# Patient Record
Sex: MALE | Race: WHITE | Employment: UNEMPLOYED | URBAN - METROPOLITAN AREA
[De-identification: names, ages, dates, MRNs, and addresses within clinical notes are randomized per-mention and may not be internally consistent; named-entity substitution may affect disease eponyms.]

---

## 2023-04-04 DIAGNOSIS — M25.562 LEFT KNEE PAIN, UNSPECIFIED CHRONICITY: Primary | ICD-10-CM

## 2023-04-07 DIAGNOSIS — M25.562 LEFT KNEE PAIN, UNSPECIFIED CHRONICITY: ICD-10-CM

## 2023-04-08 LAB
ANION GAP SERPL CALCULATED.3IONS-SCNC: 13 MMOL/L (ref 3–15)
BUN BLDV-MCNC: 13 MG/DL (ref 6–22)
CALCIUM SERPL-MCNC: 9.7 MG/DL (ref 8.4–10.5)
CHLORIDE BLD-SCNC: 103 MMOL/L (ref 98–110)
CO2: 25 MMOL/L (ref 20–32)
CREAT SERPL-MCNC: 0.8 MG/DL (ref 0.5–1.2)
GLOMERULAR FILTRATION RATE: >60 ML/MIN/1.73 SQ.M.
GLUCOSE: 147 MG/DL (ref 70–99)
HCT VFR BLD CALC: 20.5 % (ref 39.3–51.6)
HEMOGLOBIN: 6.6 G/DL (ref 13.1–17.2)
MACROCYTOSIS: ABNORMAL
MCH RBC QN AUTO: 42 PG (ref 26–34)
MCHC RBC AUTO-ENTMCNC: 32 G/DL (ref 31–36)
MCV RBC AUTO: 129 FL (ref 80–95)
MRSA SCREENING CULTURE: NORMAL
NORMACHROMIC RBC: ABNORMAL
PDW BLD-RTO: 15.4 % (ref 10–15.5)
PLATELET # BLD: 69 K/UL (ref 140–440)
PLATELET, IMMATURE FRACTION: 8 % (ref 1.1–7.1)
PMV BLD AUTO: 11.6 FL (ref 9–13)
POLYCHROMASIA: ABNORMAL
POTASSIUM SERPL-SCNC: 4.1 MMOL/L (ref 3.5–5.5)
RBC: 1.59 M/UL (ref 3.8–5.8)
SMEAR REVIEW: ABNORMAL
SODIUM BLD-SCNC: 141 MMOL/L (ref 133–145)
WBC: 3.5 K/UL (ref 4–11)

## 2023-04-10 DIAGNOSIS — M17.0 OSTEOARTHRITIS OF BOTH KNEES, UNSPECIFIED OSTEOARTHRITIS TYPE: ICD-10-CM

## 2023-04-10 DIAGNOSIS — M25.562 PAIN IN BOTH KNEES, UNSPECIFIED CHRONICITY: ICD-10-CM

## 2023-04-10 DIAGNOSIS — Z01.818 PRE-OP TESTING: ICD-10-CM

## 2023-04-10 DIAGNOSIS — M25.561 PAIN IN BOTH KNEES, UNSPECIFIED CHRONICITY: ICD-10-CM

## 2023-05-31 ENCOUNTER — OFFICE VISIT (OUTPATIENT)
Age: 58
End: 2023-05-31
Payer: COMMERCIAL

## 2023-05-31 VITALS — WEIGHT: 190 LBS | BODY MASS INDEX: 29.82 KG/M2 | HEIGHT: 67 IN

## 2023-05-31 DIAGNOSIS — M17.0 OSTEOARTHRITIS OF BOTH KNEES, UNSPECIFIED OSTEOARTHRITIS TYPE: Primary | ICD-10-CM

## 2023-05-31 PROCEDURE — 20611 DRAIN/INJ JOINT/BURSA W/US: CPT | Performed by: NURSE PRACTITIONER

## 2023-05-31 RX ORDER — LIDOCAINE HYDROCHLORIDE 10 MG/ML
9 INJECTION, SOLUTION INFILTRATION; PERINEURAL ONCE
Status: COMPLETED | OUTPATIENT
Start: 2023-05-31 | End: 2023-05-31

## 2023-05-31 RX ORDER — TRIAMCINOLONE ACETONIDE 40 MG/ML
40 INJECTION, SUSPENSION INTRA-ARTICULAR; INTRAMUSCULAR ONCE
Status: COMPLETED | OUTPATIENT
Start: 2023-05-31 | End: 2023-05-31

## 2023-05-31 RX ADMIN — TRIAMCINOLONE ACETONIDE 40 MG: 40 INJECTION, SUSPENSION INTRA-ARTICULAR; INTRAMUSCULAR at 14:41

## 2023-05-31 RX ADMIN — LIDOCAINE HYDROCHLORIDE 9 ML: 10 INJECTION, SOLUTION INFILTRATION; PERINEURAL at 14:49

## 2023-05-31 RX ADMIN — TRIAMCINOLONE ACETONIDE 40 MG: 40 INJECTION, SUSPENSION INTRA-ARTICULAR; INTRAMUSCULAR at 14:50

## 2023-05-31 RX ADMIN — LIDOCAINE HYDROCHLORIDE 9 ML: 10 INJECTION, SOLUTION INFILTRATION; PERINEURAL at 14:41

## 2023-05-31 NOTE — PROGRESS NOTES
Procedure Documentation:    I discussed in detail the risks, benefits and complications of injections FOR BILATERAL KNEE OSTEOARTHRITIS which included but are not limited to infection, skin reactions, hot swollen joint, and anaphylaxis with the patient. The patient verbalized understanding and gave informed consent for the injections. The patient's knees were flexed to 90° and the skin prepped using sterile alcohol solution. A sterile needle was inserted into each knee and the mixture of 9 mL Lidocaine 1%, 1 mL Kenalog 40 mg was injected under sterile technique. The needle was withdrawn and the puncture site sealed with a Band-Aid. Technique: Under sterile conditions a [employer] ultrasound unit with a variable frequency (7.0-14.0 MHz) linear transducer was used to localize the placement of needle into the each knee joint. Findings: Successful needle placement for knee injections. Final images were taken and saved for permanent record. The patient tolerated the injections well. The patient was instructed to call the office immediately if there is any pain, redness, warmth, fever, or chills.